# Patient Record
Sex: FEMALE | Race: BLACK OR AFRICAN AMERICAN | Employment: FULL TIME | URBAN - METROPOLITAN AREA
[De-identification: names, ages, dates, MRNs, and addresses within clinical notes are randomized per-mention and may not be internally consistent; named-entity substitution may affect disease eponyms.]

---

## 2022-12-28 ENCOUNTER — APPOINTMENT (OUTPATIENT)
Dept: GENERAL RADIOLOGY | Age: 27
End: 2022-12-28
Payer: COMMERCIAL

## 2022-12-28 ENCOUNTER — HOSPITAL ENCOUNTER (EMERGENCY)
Age: 27
Discharge: HOME | End: 2022-12-28
Attending: EMERGENCY MEDICINE
Payer: COMMERCIAL

## 2022-12-28 VITALS
TEMPERATURE: 99.1 F | DIASTOLIC BLOOD PRESSURE: 80 MMHG | RESPIRATION RATE: 14 BRPM | OXYGEN SATURATION: 99 % | HEART RATE: 76 BPM | SYSTOLIC BLOOD PRESSURE: 144 MMHG

## 2022-12-28 DIAGNOSIS — J10.1 INFLUENZA A: Primary | ICD-10-CM

## 2022-12-28 DIAGNOSIS — R05.1 ACUTE COUGH: ICD-10-CM

## 2022-12-28 DIAGNOSIS — J98.8 CONGESTION OF UPPER AIRWAY: ICD-10-CM

## 2022-12-28 LAB
RAPID INFLUENZA  B AGN: NEGATIVE
RAPID INFLUENZA A AGN: POSITIVE
SARS-COV-2, NAAT: NOT DETECTED

## 2022-12-28 PROCEDURE — 71046 X-RAY EXAM CHEST 2 VIEWS: CPT

## 2022-12-28 PROCEDURE — 87804 INFLUENZA ASSAY W/OPTIC: CPT

## 2022-12-28 PROCEDURE — 87635 SARS-COV-2 COVID-19 AMP PRB: CPT

## 2022-12-28 PROCEDURE — 99284 EMERGENCY DEPT VISIT MOD MDM: CPT

## 2022-12-28 PROCEDURE — 6370000000 HC RX 637 (ALT 250 FOR IP): Performed by: EMERGENCY MEDICINE

## 2022-12-28 RX ORDER — IBUPROFEN 400 MG/1
400 TABLET ORAL ONCE
Status: COMPLETED | OUTPATIENT
Start: 2022-12-28 | End: 2022-12-28

## 2022-12-28 RX ORDER — ACETAMINOPHEN 325 MG/1
650 TABLET ORAL ONCE
Status: COMPLETED | OUTPATIENT
Start: 2022-12-28 | End: 2022-12-28

## 2022-12-28 RX ORDER — BENZONATATE 100 MG/1
100-200 CAPSULE ORAL 3 TIMES DAILY PRN
Qty: 40 CAPSULE | Refills: 0 | Status: SHIPPED | OUTPATIENT
Start: 2022-12-28 | End: 2023-01-07

## 2022-12-28 RX ADMIN — ACETAMINOPHEN 650 MG: 325 TABLET, FILM COATED ORAL at 07:43

## 2022-12-28 RX ADMIN — IBUPROFEN 400 MG: 400 TABLET, FILM COATED ORAL at 07:43

## 2022-12-28 ASSESSMENT — PAIN SCALES - GENERAL
PAINLEVEL_OUTOF10: 6
PAINLEVEL_OUTOF10: 8
PAINLEVEL_OUTOF10: 8

## 2022-12-28 ASSESSMENT — PAIN - FUNCTIONAL ASSESSMENT
PAIN_FUNCTIONAL_ASSESSMENT: NONE - DENIES PAIN
PAIN_FUNCTIONAL_ASSESSMENT: NONE - DENIES PAIN
PAIN_FUNCTIONAL_ASSESSMENT: 0-10

## 2022-12-28 ASSESSMENT — PAIN DESCRIPTION - LOCATION: LOCATION: GENERALIZED

## 2022-12-28 ASSESSMENT — PAIN DESCRIPTION - DESCRIPTORS: DESCRIPTORS: ACHING

## 2022-12-28 NOTE — DISCHARGE INSTRUCTIONS
Your rapid flu test today was positive. Take over the counter medications like NSAIDs (ibuprofen), tylenol, sudafed, mucinex, for symptoms of body aches, congestion, fevers. We have also prescribed tessalon pearls for cough. On average viral symptoms last about two weeks. Follow up with primary care, we have given you a referral since you do not have one listed. Return to ED for any new or worsening symptoms or concerns.

## 2022-12-28 NOTE — ED NOTES
Pt d/c home with AVS no s.s of distress noted script x 1 in hand she denies questions about f/u      Carson Fofana RN  12/28/22 1821

## 2022-12-28 NOTE — Clinical Note
Yessy Finnegan was seen and treated in our emergency department on 12/28/2022. She may return to work on 01/02/2023. If you have any questions or concerns, please don't hesitate to call.       Isai Woodard MD

## 2022-12-28 NOTE — ED PROVIDER NOTES
[facility] ENCOUNTER      Pt Name: Ivana Burgess  MRN: [id number]  [place] 1995  Date of evaluation: 12/28/2022  Provider: Laura Encarnacion MD    CHIEF COMPLAINT     Sick   HISTORY OF PRESENT ILLNESS  (Location/Symptom, Timing/Onset,Context/Setting, Quality, Duration, Modifying Factors, Severity). Note limiting factors. Chief Complaint   Patient presents with    Cough    Pleurisy     Pt c/o cough and pain with cough x 1 week. Pt is febrile at this time. Ivana Burgess is a 28 y.o. female who presents to the emergency department secondary to concern for not feeling well. She reports symptoms of been ongoing for about a week. She states that she has had intermittent chest pain with a cough, her cough is nonproductive. No chest tightness, no trouble breathing. She has had fevers. The symptoms are worse at night. She has body aches, congestion, headache. She reports no prior medical history. Does not smoke cigarettes. States she was vaccinated for COVID, did not receive boosters, has not had the flu vaccine. At home she has tried ibuprofen and Aleve intermittently, has not taken anything today. At home has been eating and drinking at least 50% of her usual intake. Past medical history noted below. Aside from what is stated above denies any other symptoms or modifying factors.  used, [facility], #[id number]. Nursing Notes reviewed. REVIEW OF SYSTEMS  (2-9 systems for level 4, 10 or more for level 5)   Review of Systems Pertinent positive and negative findings as documented in the HPI; otherwise all other systems were reviewed and were negative. PAST MEDICAL HISTORY   No past medical history on file. SURGICALHISTORY     No past surgical history on file. CURRENT MEDICATIONS       Previous Medications    No medications on file      ALLERGIES     Patient has no allergy information on record.   FAMILY HISTORY     No family history on file. SOCIAL HISTORY       Social History     Socioeconomic History    Marital status: Single   Tobacco Use    Smoking status: Never    Smokeless tobacco: Never   Substance and Sexual Activity    Alcohol use: Never    Drug use: Never     SCREENINGS    [place] Coma Scale  Eye Opening: Spontaneous  Best Verbal Response: Oriented  Best Motor Response: Obeys commands  [place] Coma Scale Score: 15    PHYSICAL EXAM  (up to 7 for level 4, 8 or more for level 5)   INITIAL VITALS: BP: (!) 144/80, Temp: (!) 101 °F (38.3 °C), Heart Rate: (!) 103, Resp: 18, SpO2: 99 %   Physical Exam  Vitals and nursing note reviewed. Constitutional:       General: She is not in acute distress. Appearance: She is ill-appearing (Appears to feel unwell). She is not toxic-appearing or diaphoretic. HENT:      Head: Normocephalic and atraumatic. Right Ear: External ear normal.      Left Ear: External ear normal.      Mouth/Throat:      Mouth: Mucous membranes are moist.      Pharynx: Posterior oropharyngeal erythema (Mild) present. No oropharyngeal exudate. Eyes:      General: No scleral icterus. Right eye: No discharge. Left eye: No discharge. Conjunctiva/sclera: Conjunctivae normal.   Neck:      Trachea: No tracheal deviation. Cardiovascular:      Rate and Rhythm: Normal rate. Pulses: Normal pulses. Heart sounds: No murmur heard. Pulmonary:      Effort: Pulmonary effort is normal. No respiratory distress. Abdominal:      Tenderness: There is no abdominal tenderness. There is no right CVA tenderness, left CVA tenderness, guarding or rebound. Musculoskeletal:      Cervical back: Normal range of motion. No rigidity. Right lower leg: No edema. Left lower leg: No edema. Lymphadenopathy:      Cervical: No cervical adenopathy. Skin:     General: Skin is dry. Capillary Refill: Capillary refill takes less than 2 seconds.    Neurological:      General: No focal deficit present. Mental Status: She is alert and oriented to person, place, and time. DIAGNOSTIC RESULTS     RADIOLOGY:   Interpretation per Radiologist below, if available at the time of this note:  XR CHEST (2 VW)   Final Result   No acute process. LABS:  Labs Reviewed   RAPID INFLUENZA A/B ANTIGENS - Abnormal; Notable for the following components:       Result Value    Rapid Influenza A Ag POSITIVE (*)     All other components within normal limits   COVID-19, RAPID       EMERGENCY DEPARTMENT COURSE and DIFFERENTIAL DIAGNOSIS/MDM:   Patient was given the following medications:  Orders Placed This Encounter   Medications    ibuprofen (ADVIL;MOTRIN) tablet 400 mg    acetaminophen (TYLENOL) tablet 650 mg    benzonatate (TESSALON PERLES) 100 MG capsule     Sig: Take 1-2 capsules by mouth 3 times daily as needed for Cough     Dispense:  40 capsule     Refill:  0     CONSULTS:  None    INITIAL VITALS: BP: (!) 144/80, Temp: (!) 101 °F (38.3 °C), Heart Rate: (!) 103, Resp: 18, SpO2: 99 %   RECENT VITALS:  BP: (!) 144/80,Temp: 99.1 °F (37.3 °C), Heart Rate: 76, Resp: 14, SpO2: 99 %     Is this patient to be included in the SEP-1 Core Measure due to severe sepsis or septic shock? No   Exclusion criteria - the patient is NOT to be included for SEP-1 Core Measure due to:  Viral etiology found or highly suspected (including COVID-19) without concomitant bacterial infection    Yanni Betancourt is a 28 y.o. female who presents to the emergency department secondary to concern for symptoms as noted in HPI. On arrival she is awake, alert, oriented. Her vitals are hemodynamically stable though notable for elevated heart rate which is consistent with her fever. She answers questions appropriately and [place] Mya in full sentences. Lungs with some rhonchi noted throughout. Abdomen benign, no peripheral edema, posterior oropharynx is clear. Ordered flu, COVID, chest x-ray along with medications as noted above.     On reassessment kulwant results of test showing she is flu positive. Chest x-ray is reassuring. At that time her fever and heart rate have both improved after medications here we discussed symptomatic treatment. Impinj  was again used. Discussed use of Tessalon Perles for cough. Discussed continued wearing a mask to protect others. She expressed understanding of all instructions, was in agreement with plan, and was discharged home in stable condition. FINAL IMPRESSION      1. Influenza A    2. Acute cough    3.  Congestion of upper airway        DISPOSITION/PLAN   DISPOSITION Discharge - Pending Orders Complete 12/28/2022 08:23:35 AM      PATIENT REFERRED TO:  Catie Eduarcooper  [phone]  Call   For follow up appointment to set up primary care      DISCHARGE MEDICATIONS:  New Prescriptions    BENZONATATE (TESSALON PERLES) 100 MG CAPSULE    Take 1-2 capsules by mouth 3 times daily as needed for Cough            (Please note that portions of this note were completed with a voice recognition program. Efforts were made to edit the dictations but occasionally words are mis-transcribed.)    Sammie Bowden MD (electronically signed)  Attending Emergency Physician       Sammie Bowden MD  12/28/22 [id number]